# Patient Record
Sex: FEMALE | Race: WHITE | URBAN - METROPOLITAN AREA
[De-identification: names, ages, dates, MRNs, and addresses within clinical notes are randomized per-mention and may not be internally consistent; named-entity substitution may affect disease eponyms.]

---

## 2021-05-19 ENCOUNTER — OFFICE VISIT (OUTPATIENT)
Dept: BEHAVIORAL/MENTAL HEALTH CLINIC | Age: 21
End: 2021-05-19
Payer: COMMERCIAL

## 2021-05-19 DIAGNOSIS — F41.1 GENERALIZED ANXIETY DISORDER: ICD-10-CM

## 2021-05-19 DIAGNOSIS — Z92.89 HISTORY OF RECENT HOSPITALIZATION: ICD-10-CM

## 2021-05-19 DIAGNOSIS — F12.90 MARIJUANA USER: ICD-10-CM

## 2021-05-19 DIAGNOSIS — R45.89 SUICIDE RISK: ICD-10-CM

## 2021-05-19 DIAGNOSIS — F31.9 BIPOLAR DISORDER, RAPID CYCLING (HCC): Primary | ICD-10-CM

## 2021-05-19 PROCEDURE — 99203 OFFICE O/P NEW LOW 30 MIN: CPT | Performed by: COUNSELOR

## 2021-05-19 ASSESSMENT — PATIENT HEALTH QUESTIONNAIRE - PHQ9
6. FEELING BAD ABOUT YOURSELF - OR THAT YOU ARE A FAILURE OR HAVE LET YOURSELF OR YOUR FAMILY DOWN: 3
SUM OF ALL RESPONSES TO PHQ9 QUESTIONS 1 & 2: 6
SUM OF ALL RESPONSES TO PHQ QUESTIONS 1-9: 23
5. POOR APPETITE OR OVEREATING: 1
8. MOVING OR SPEAKING SO SLOWLY THAT OTHER PEOPLE COULD HAVE NOTICED. OR THE OPPOSITE, BEING SO FIGETY OR RESTLESS THAT YOU HAVE BEEN MOVING AROUND A LOT MORE THAN USUAL: 3
2. FEELING DOWN, DEPRESSED OR HOPELESS: 3
1. LITTLE INTEREST OR PLEASURE IN DOING THINGS: 3
10. IF YOU CHECKED OFF ANY PROBLEMS, HOW DIFFICULT HAVE THESE PROBLEMS MADE IT FOR YOU TO DO YOUR WORK, TAKE CARE OF THINGS AT HOME, OR GET ALONG WITH OTHER PEOPLE: 3
9. THOUGHTS THAT YOU WOULD BE BETTER OFF DEAD, OR OF HURTING YOURSELF: 3

## 2021-05-19 ASSESSMENT — COLUMBIA-SUICIDE SEVERITY RATING SCALE - C-SSRS
1. WITHIN THE PAST MONTH, HAVE YOU WISHED YOU WERE DEAD OR WISHED YOU COULD GO TO SLEEP AND NOT WAKE UP?: YES
5. HAVE YOU STARTED TO WORK OUT OR WORKED OUT THE DETAILS OF HOW TO KILL YOURSELF? DO YOU INTEND TO CARRY OUT THIS PLAN?: NO

## 2021-05-19 NOTE — PROGRESS NOTES
[facility]    Time Start: 10:20am   Time End: 11:00am  Date of Service:  5/19/2021    Basis of Evaluation:   [x]  Clinical Interview  [x]  Review of Medical Record    [x] Patient Health Questionnaire (PHQ)  []  Interview family member    Presenting Concerns:  Yuly Mcnulty (prefers to go by [facility]) is a 24 y.o. who was referred by law enforcement mandate, due to concerns about bipolar symptoms, substance abuse, and suicide ideation. She presented as manic. Jong Bonilla reported the following symptoms of bipolar: depressed mood, difficulty concentrating, feelings of worthlessness/guilt, hopelessness, impaired memory, recurrent thoughts of death, suicidal thoughts without plan and debra. She also reported crying spells, sense of worthlessness, sense of helplessness, sense of hopelessness, irritability, pessimism, sad mood, anxiousness and suicidal thoughts. In addition, Jong Bonilla reported symptoms of excessive worry, agitation, fearfulness and depression. There is evidence of debra. She reported that her symptoms began \"probably most of my life, but I only became aware after finishing up 18 days of inpatient treatment at [facility] a month ago. It was really traumatizing. \" Additional exacerbating stressors include family issues, financial concern, employment concern and recent pending legal issues. Previous behavioral health treatment history: Therapy, Out Patient ([facility] described having \"seen therapists before\" but was too manic to specify any details; and In Patient (court mandated inpatient stay at [facility] for 18 days). [facility] currently has no medications in their medication list. She shared that she is supposed to be taking Abilify; however, she voluntarily discontinued use because she didn't like the way it made her feel.     PHQ Scores 5/19/2021   PHQ2 Score 6   PHQ9 Score 23     Interpretation of Total Score Depression Severity: 1-4 = Minimal depression, 5-9 = Mild depression, 10-14 = Moderate depression, 15-19 = Moderately severe depression, 20-27 = Severe depression    Mental Status:  Appearance: casually dressed and thin & gaunt looking   Affect:  consistent with expectations based upon mood   Attitude: Cooperative and Engageable   Mood:   Irritable, Euphoric, Dysphoric, Apathetic and Anxious   Thought Process:  Loose or idiosyncratic associations and Disorganized   Delusions: paranoid   Perceptions: No perceptual disturbance   Behavior:   open, cooperative, restless and tearful   Psychomotor: Within normal limits   Speech:   Pressured and Loud   Eye Contact: good   Orientation:  oriented to person, place, time, and general circumstances   Judgment & Insight:  paranoid ideations     Risk Assessment:  Current Suicide Risk:  suicidal ideation with no plan or intent, nonsuicidal morbid ideation  Current Homicide Risk:  no homocidal ideation    Ghazala reported previous history of suicidal ideation without intent or plan\"; \"previous history of suicidal ideation with plan, but no intent or action. \"I would never do anything, trust me. I care too much about my niece and nephew\"    Social History/Functioning:  Marie Maradiaga is currently living with family (lives with mother) and reported little social support (does not report friends or family that support her). .She denied any current cultural or spiritual concerns.      Social History     Socioeconomic History    Marital status: Single     Spouse name: Not on file    Number of children: Not on file    Years of education: Not on file    Highest education level: Not on file   Occupational History    Not on file   Tobacco Use    Smoking status: Not on file   Substance and Sexual Activity    Alcohol use: Not on file    Drug use: Not on file    Sexual activity: Not on file   Other Topics Concern    Not on file   Social History Narrative    Not on file     Social Determinants of Health Financial Resource Strain:     Difficulty of Paying Living Expenses:    Food Insecurity:     Worried About Running Out of Food in the Last Year:     [address] in the Last Year:    Transportation Needs:     Lack of Transportation (Medical):  Lack of Transportation (Non-Medical):    Physical Activity:     Days of Exercise per Week:     Minutes of Exercise per Session:    Stress:     Feeling of Stress :    Social Connections:     Frequency of Communication with Friends and Family:     Frequency of Social Gatherings with Friends and Family:     Attends Buddhism Services:     Active Member of Clubs or Organizations:     Attends Club or Organization Meetings:     Marital Status:    Intimate Partner Violence:     Fear of Current or Ex-Partner:     Emotionally Abused:     Physically Abused:     Sexually Abused:        No past medical history on file. Diagnosis:   Diagnosis Orders   1. Bipolar disorder, rapid cycling ([address]     2. Generalized anxiety disorder     3. Suicide risk     4. Marijuana user     5. History of recent hospitalization         Strengths: Motivated for treatment   Limitations: Not particularly psychologically minded    Patient Response to Plan/Recommendations: Today, we discussed psychoeducation of treatment for Bipolar disorder, anxiety, substance use, and persistent suicide ideation. Primary goals of therapy were collaboratively identified as Bipolar disorder management, decrease symptoms of anxiety, decrease substance use, and increase emotional regulation. Discussed confidentiality and limits of confidentiality as it applies to treatment within [facility] and my role as member of the medical treatment team.     Assessment, Impressions and Plan:  Michelle Hammer (Shane Tyler) is a typical 24 y.o. old female who presents with severe Bipoloar manic and depressive symptoms that are interfering with her work, family and social functioning.  Michelle Hammer expresses fair insight into her symptoms. Meri Clement will likely benefit from Cognitive Behavioral therapy to challenge irrational thoughts, Psychoanalysis for insight development, Existential Therapy for acceptance and motivation, and Dialectical Behavioral Therapy to address emotional management. Her symptoms are in the severe range and she will likely need 16-18 therapy sessions. Initial Treatment Plan/Recommendations:  No follow-ups on file. Contact Ash Osman [facility] as needed.       Electronically signed by Ash Osman, [facility], Ramez, LPCC-S  Licensed Professional Clinical Counselor  Director of [address] and [facility] Medicine Residency Program at [facility]

## 2021-05-27 ENCOUNTER — OFFICE VISIT (OUTPATIENT)
Dept: BEHAVIORAL/MENTAL HEALTH CLINIC | Age: 21
End: 2021-05-27
Payer: COMMERCIAL

## 2021-05-27 DIAGNOSIS — F41.1 GENERALIZED ANXIETY DISORDER: ICD-10-CM

## 2021-05-27 DIAGNOSIS — F12.90 MARIJUANA USER: ICD-10-CM

## 2021-05-27 DIAGNOSIS — F31.9 BIPOLAR DISORDER, RAPID CYCLING (HCC): Primary | ICD-10-CM

## 2021-05-27 PROCEDURE — 90837 PSYTX W PT 60 MINUTES: CPT | Performed by: COUNSELOR

## 2021-05-30 NOTE — PROGRESS NOTES
[facility]    Time Start: 2:00pm   Time End: 3:00pm  Date of Service:  5/27/2021    Subjective:  \"Rick\" began session with less display of manic behaviors, in comparison to last week's session where she ran approximately 15 minutes late for her session, and began sobbing, swearing, and leaving the practice out of frustration from being late. She continues to present as manic, as evidenced by loquaciousness and transitioning from one topic to another without pause. Margie Alaniz reported that she has had several \"rough days\" hallmarked by fighting with her mother, and high anxiety. She indicates the desire to physically hit her, but refrains. Rather, verbal altercations result when her mother's worry and panic keeps her \"hyperinvolved\" with every detail of Rick's life. Margie Alaniz recognizes she has a history of high-risk behavior, and does not blame her mom for always being worried and accusatory; however, she states that she simply wants a \"blank slate\" to start fresh. Margie Alaniz highlighted an example this past week where she was feeling very depressed (not suicidal) and when she left her home, her mother immediately assumed and accused her of going to a \"drug dealer's house. \" This further triggered Margie Alaniz, yet she was able to leave without further incident. Note, Rick shared she went to a friend's house to binge watch a show called, \"Gideon,\" explaining that she \"sees a lot myself\" in the main character. When describing her time at a friend's house, she mentioned that she could've just \"gone to get some niya\" but then followed that up with her decision to remain abstinent due to \"being off birth control. \" Follow-up with more detail regarding Rick's sexual behaviors, relationship hx, and social connections. Clinician shared dx's with Rick to gauge her impression and acceptance.  Margie Alaniz commented that she supposes it \"makes sense\" given she remembers many incidences of debra prior to hospitalization (ie.her ongoing desire to hitchhike to [place] without any resources or plan). She reports feeling zero control over her mood shifts, which \"change every day. \" Akin Hogue was invited to consider a narrative of simply being a person with 'Bipolar,' rather a person who is comprised of many intricacies, one of which includes 'Bipolar.' She was asked to think about her dx as symptoms she experiences, rather than a label. Further, she was asked to journal her daily experiences. She became very excited to journal, sharing that she loves to write and would \"make\" her mom go buy a journal. Follow-up on journal entries and mood shifts. Objective:  Mental Status  Appearance: age appropriate, casually dressed, thin & gaunt looking and within normal Limits   Affect:  flat   Attitude: Cooperative and Engageable   Mood:   Euphoric and Anxious   Thought Process:  Loose or idiosyncratic associations, Flight of ideas, Disorganized and goal directed   Delusions: no evidence of delusions   Perceptions: No perceptual disturbance   Behavior:   open, cooperative, tearful and difficult to redirect   Psychomotor: Within normal limits   Speech:   Loquacious   Eye Contact: good   Orientation:  oriented to person, place, time, and general circumstances   Judgment & Insight:  normal insight and judgment     Risk Assessment:  Current Suicide Risk:  no suicidal ideation, nonsuicidal morbid ideation  Current Homicide Risk:  no homocidal ideation    Diagnosis:   Diagnosis Orders   1. Bipolar disorder, rapid cycling ([facility] [address]     2. Generalized anxiety disorder     3.  Marijuana user       Plan/Recommendations:  Continue clinical therapy using evidence-based techniques for treatment of bipolar disorder, anxiety, substance use, and persistent suicide ideation. Primary goals of therapy continue to be collaboratively identified as Bipolar disorder management, decrease symptoms of anxiety, decrease substance use, and increase emotional regulation. Continue use of Cognitive Behavioral therapy to challenge irrational thoughts, Psychoanalysis for insight development, Existential Therapy for acceptance and motivation, and Dialectical Behavioral Therapy to address emotional management.     Electronically signed by Demetrius Lares [facility] QUOC SINCLAIR Ed.D., LPCC-S  Licensed Professional Clinical Counselor  Director of [address] [facility] and [facility] Medicine Residency Program at [facility]

## 2021-06-17 ENCOUNTER — OFFICE VISIT (OUTPATIENT)
Dept: BEHAVIORAL/MENTAL HEALTH CLINIC | Age: 21
End: 2021-06-17
Payer: COMMERCIAL

## 2021-06-17 DIAGNOSIS — F41.1 GENERALIZED ANXIETY DISORDER: ICD-10-CM

## 2021-06-17 DIAGNOSIS — F12.90 MARIJUANA USER: ICD-10-CM

## 2021-06-17 DIAGNOSIS — F31.9 BIPOLAR DISORDER, RAPID CYCLING (HCC): Primary | ICD-10-CM

## 2021-06-17 PROCEDURE — 99215 OFFICE O/P EST HI 40 MIN: CPT | Performed by: COUNSELOR

## 2021-06-21 NOTE — PROGRESS NOTES
[facility]    Time Start: 2:00pm    Time End: 3:00pm  Date of Service:  6/17/2021    Subjective:  Rick shared the past two weeks were sometimes a struggle and reported a couple of really down days and that that there were increasingly more better days.  She described having two days where she did not want to get out of bed, and was bothered by all the items in her room. While she feels her bedroom is not messy, she feels there are too many items that clutter the space. As a result, she decided to completely redo the layout and setup of her room. In retrospect, Derrick Hussein agrees this was another example of manic bx. 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 After further discussion, Derrick Hussein indicated she needs to prioritize her mental health and plans to ask to only work at the [employer] on Sundays. Last, she was prompted to list out areas of her life that align wellness, and how she plans to incorporate these into her work life. Rick indicated attending therapy, journaling, going to the gym for exercise, and socializing with her friends Renata Torrez and Cha Ramesh) are all positive areas of wellness in her life. Continue to explore each of these areas, including social relationships next session.      Objective:  Mental Status:  Appearance: age appropriate, casually dressed, thin & gaunt looking and within normal Limits   Affect:  consistent with expectations based upon mood   Attitude: Cooperative and Engageable   Mood:   Euphoric and

## 2021-06-24 ENCOUNTER — OFFICE VISIT (OUTPATIENT)
Dept: BEHAVIORAL/MENTAL HEALTH CLINIC | Age: 21
End: 2021-06-24
Payer: COMMERCIAL

## 2021-06-24 DIAGNOSIS — Z87.898 HISTORY OF DOMESTIC VIOLENCE: ICD-10-CM

## 2021-06-24 DIAGNOSIS — F41.1 GENERALIZED ANXIETY DISORDER: ICD-10-CM

## 2021-06-24 DIAGNOSIS — F12.90 MARIJUANA USER: ICD-10-CM

## 2021-06-24 DIAGNOSIS — F31.9 BIPOLAR DISORDER, RAPID CYCLING (HCC): Primary | ICD-10-CM

## 2021-06-24 PROCEDURE — 90837 PSYTX W PT 60 MINUTES: CPT | Performed by: COUNSELOR

## 2021-07-21 ENCOUNTER — TELEPHONE (OUTPATIENT)
Dept: PRIMARY CARE CLINIC | Age: 21
End: 2021-07-21